# Patient Record
Sex: FEMALE | Race: AMERICAN INDIAN OR ALASKA NATIVE
[De-identification: names, ages, dates, MRNs, and addresses within clinical notes are randomized per-mention and may not be internally consistent; named-entity substitution may affect disease eponyms.]

---

## 2018-08-23 ENCOUNTER — HOSPITAL ENCOUNTER (OUTPATIENT)
Dept: HOSPITAL 5 - MAMMO | Age: 38
Discharge: HOME | End: 2018-08-23
Attending: OBSTETRICS & GYNECOLOGY
Payer: COMMERCIAL

## 2018-08-23 DIAGNOSIS — R92.8: Primary | ICD-10-CM

## 2018-08-23 PROCEDURE — 77066 DX MAMMO INCL CAD BI: CPT

## 2018-08-23 NOTE — ULTRASOUND REPORT
BILATERAL DIGITAL DIAGNOSTIC MAMMOGRAM with CAD and LEFT BREAST 

ULTRASOUND: 08/23/18 10:13:00



CLINICAL: Left breast lump.  The patient states that she was in an 

emergency room approximately one month ago and pus was drained from her 

left breast.  She was put on antibiotics and she has completed the 

antibiotics.  She was also told to get a mammogram.  



COMPARISON:None.



FINDINGS: The breasts are almost entirely fatty.No mass, architectural 

distortion or suspicious calcifications.  Focal skin thickening of the 

lower inner left breast correlates with the symptomatic site.  The skin 

measures 8 mm maximum thickness and there is no underlying mass.  No 

mass architectural distortion or suspicious calcifications of either 

breast.



Ultrasound of the left breast demonstrated focal skin thickening at 8 

o'clock approximately 9 cm from the nipple.  No mass, cyst or abscess 

is identified.



IMPRESSION: Benign residual inflammatory skin thickening at the site of 

a treated breast or skin abscess.No suspicious findings.



BI-RADS CATEGORY:  2 - - Benign



RECOMMENDATION: Clinical followup and routine mammographic screening.



ACR BI-RADS MAMMOGRAPHIC CODES:

0 = Needs additional imaging evaluation; 1 = Negative; 2 = Benign; 3 = 

Probably benign; 4 = Suspicious; 5 = Malignant; 6 = Known biopsy-proven 

malignancy



COMMENT:

      1.   Dense breast tissue, i.e., adenosis, fibrocystic 

            changes, etc., may obscure an underlying neoplasm.

      2.   Approximately 10% of cancers are not detected with

            mammography.

      3.   A negative mammography report should not delay biopsy 

            if a clinically suspicious mass is present.





COMMENT:

Patient follow-up letters are generated by our [employer] application.

## 2018-08-23 NOTE — MAMMOGRAPHY REPORT
BILATERAL DIGITAL DIAGNOSTIC MAMMOGRAM with CAD and LEFT BREAST 

ULTRASOUND: 08/23/18 10:13:00



CLINICAL: Left breast lump.  The patient states that she was in an 

emergency room approximately one month ago and pus was drained from her 

left breast.  She was put on antibiotics and she has completed the 

antibiotics.  She was also told to get a mammogram.  



COMPARISON:None.



FINDINGS: The breasts are almost entirely fatty.No mass, architectural 

distortion or suspicious calcifications.  Focal skin thickening of the 

lower inner left breast correlates with the symptomatic site.  The skin 

measures 8 mm maximum thickness and there is no underlying mass.  No 

mass architectural distortion or suspicious calcifications of either 

breast.



Ultrasound of the left breast demonstrated focal skin thickening at 8 

o'clock approximately 9 cm from the nipple.  No mass, cyst or abscess 

is identified.



IMPRESSION: Benign residual inflammatory skin thickening at the site of 

a treated breast or skin abscess.No suspicious findings.



BI-RADS CATEGORY:  2 - - Benign



RECOMMENDATION: Clinical followup and routine mammographic screening.



ACR BI-RADS MAMMOGRAPHIC CODES:

0 = Needs additional imaging evaluation; 1 = Negative; 2 = Benign; 3 = 

Probably benign; 4 = Suspicious; 5 = Malignant; 6 = Known biopsy-proven 

malignancy



COMMENT:

      1.   Dense breast tissue, i.e., adenosis, fibrocystic 

            changes, etc., may obscure an underlying neoplasm.

      2.   Approximately 10% of cancers are not detected with

            mammography.

      3.   A negative mammography report should not delay biopsy 

            if a clinically suspicious mass is present.





COMMENT:

Patient follow-up letters are generated by our [employer] application.